# Patient Record
Sex: FEMALE | Race: ASIAN | NOT HISPANIC OR LATINO | URBAN - METROPOLITAN AREA
[De-identification: names, ages, dates, MRNs, and addresses within clinical notes are randomized per-mention and may not be internally consistent; named-entity substitution may affect disease eponyms.]

---

## 2021-03-29 ENCOUNTER — OFFICE VISIT (OUTPATIENT)
Dept: PRIMARY CARE | Facility: CLINIC | Age: 21
End: 2021-03-29
Payer: COMMERCIAL

## 2021-03-29 VITALS
WEIGHT: 135 LBS | RESPIRATION RATE: 16 BRPM | HEIGHT: 63 IN | OXYGEN SATURATION: 98 % | BODY MASS INDEX: 23.92 KG/M2 | DIASTOLIC BLOOD PRESSURE: 70 MMHG | SYSTOLIC BLOOD PRESSURE: 118 MMHG | HEART RATE: 62 BPM

## 2021-03-29 DIAGNOSIS — Z00.00 ENCOUNTER FOR PHYSICAL EXAMINATION: Primary | ICD-10-CM

## 2021-03-29 DIAGNOSIS — F32.1 MODERATE MAJOR DEPRESSION (CMS/HCC): ICD-10-CM

## 2021-03-29 DIAGNOSIS — Z23 NEED FOR TDAP VACCINATION: ICD-10-CM

## 2021-03-29 DIAGNOSIS — N92.6 IRREGULAR MENSES: ICD-10-CM

## 2021-03-29 DIAGNOSIS — L70.0 ACNE VULGARIS: ICD-10-CM

## 2021-03-29 DIAGNOSIS — B36.0 TINEA VERSICOLOR: ICD-10-CM

## 2021-03-29 PROCEDURE — 99203 OFFICE O/P NEW LOW 30 MIN: CPT | Mod: 25 | Performed by: FAMILY MEDICINE

## 2021-03-29 PROCEDURE — 90715 TDAP VACCINE 7 YRS/> IM: CPT | Performed by: FAMILY MEDICINE

## 2021-03-29 PROCEDURE — 99385 PREV VISIT NEW AGE 18-39: CPT | Mod: 25 | Performed by: FAMILY MEDICINE

## 2021-03-29 PROCEDURE — 90471 IMMUNIZATION ADMIN: CPT | Performed by: FAMILY MEDICINE

## 2021-03-29 RX ORDER — CYANOCOBALAMIN (VITAMIN B-12) 2500 MCG
TABLET, SUBLINGUAL SUBLINGUAL
COMMUNITY

## 2021-03-29 RX ORDER — ECONAZOLE NITRATE 10 MG/G
CREAM TOPICAL
COMMUNITY
Start: 2021-02-22 | End: 2021-03-29

## 2021-03-29 RX ORDER — CICLOPIROX 1 G/100ML
SHAMPOO TOPICAL
COMMUNITY
Start: 2021-03-03 | End: 2022-01-07

## 2021-03-29 RX ORDER — FLUCONAZOLE 150 MG/1
TABLET ORAL
Qty: 4 TABLET | Refills: 0 | Status: SHIPPED | OUTPATIENT
Start: 2021-03-29 | End: 2021-04-15

## 2021-03-29 RX ORDER — VALACYCLOVIR HYDROCHLORIDE 500 MG/1
500 TABLET, FILM COATED ORAL
COMMUNITY
Start: 2021-01-25 | End: 2022-02-27 | Stop reason: SDUPTHER

## 2021-03-29 SDOH — HEALTH STABILITY: MENTAL HEALTH: HOW OFTEN DO YOU HAVE A DRINK CONTAINING ALCOHOL?: MONTHLY OR LESS

## 2021-03-29 ASSESSMENT — ENCOUNTER SYMPTOMS
CHILLS: 0
DIARRHEA: 0
VOMITING: 0
ABDOMINAL PAIN: 0
EYE PAIN: 0
SORE THROAT: 0
CHEST TIGHTNESS: 0
APNEA: 0
FREQUENCY: 0
UNEXPECTED WEIGHT CHANGE: 0
JOINT SWELLING: 0
ADENOPATHY: 0
CONSTIPATION: 0
HEADACHES: 0
FATIGUE: 0
DIFFICULTY URINATING: 0
NUMBNESS: 0
MYALGIAS: 0
WOUND: 0
NAUSEA: 0
HEMATURIA: 0
COUGH: 0
FEVER: 0
BRUISES/BLEEDS EASILY: 0
BLOOD IN STOOL: 0
RHINORRHEA: 0
SHORTNESS OF BREATH: 0
DIZZINESS: 0
BACK PAIN: 0
TROUBLE SWALLOWING: 0
WEAKNESS: 0
PHOTOPHOBIA: 0
PALPITATIONS: 0

## 2021-03-29 ASSESSMENT — PATIENT HEALTH QUESTIONNAIRE - PHQ9
SUM OF ALL RESPONSES TO PHQ9 QUESTIONS 1 & 2: 4
SUM OF ALL RESPONSES TO PHQ QUESTIONS 1-9: 16

## 2021-03-29 NOTE — ASSESSMENT & PLAN NOTE
Discussed acne treatment options including topical medications and oral medications.  We discussed spironolactone versus antibiotic and patient is more inclined to spironolactone but will wait for the results of her testosterone and DHEA-S testing.

## 2021-03-29 NOTE — ASSESSMENT & PLAN NOTE
Annual physical exam conducted as above.   Anticipatory guidance provided regarding: healthy diet, exercise, contraception.   Problems addressed as below.   Health Maintenance discussed.   Immunizations updated.

## 2021-03-29 NOTE — PROGRESS NOTES
"NEW PATIENT VISIT - ANNUAL PHYSICAL    SIRIA RICO D.O.  [facility]  [address] [place] [address]  5th Floor  [facility] [place] [address]  [phone]      HISTORY OF PRESENT ILLNESS        CC: establish care, physical     HPI:  Vanessa Guzman is a 20 y.o. female without significant PMHx who presents for a new patient visit.     Today we discussed the followin. Hormonal Issues: \"Feels like hormones have gone wack.\" She was on an OCP and stopped in November because she was worried about taking it. Then in January, she started getting really bad acne. She did have acne when she was [age], she was then on the pill. She feels like her hair has been thicker on her body, she has noticed that she has a lot of inflammation everywhere. She feels like she has been having issues with breakouts. She has also not had a period for 2-3 month. She used to have a regular period prior to being on the pill in the past.     2. Tinea Versicolor: She has been using ciclopirox shampoo which has been unsuccessful in treating this rash, it seems to be getting worse.    Dentist: goes twice yearly  Eye Doctor: goes yearly    PHQ2: 4    Immunizations  HPV: completed  Tdap: last on record    Flu Vaccine advised:     Health Maintenance  Pap Smear: 2019, [facility]  HIV Screening: negative per patient  Hep C Screening: negative per patient     PAST MEDICAL AND SURGICAL HISTORY        Past Medical History:   Diagnosis Date   • Allergies        History reviewed. No pertinent surgical history.  MEDICATIONS          Current Outpatient Medications:   •  biotin 5,000 mcg tablet, sublingual, Place under the tongue., Disp: , Rfl:   •  ciclopirox (LOPROX) 1 % shampoo, USE AS WASH 2 3X PER WEEK, Disp: , Rfl:   •  L.acid,gas,plan,rhm/B.ani/cran (UP4 PROBIOTICS WOMEN'S ORAL), Take by mouth., Disp: , Rfl:   •  valACYclovir (VALTREX) 500 mg tablet, Take 500 mg by mouth once daily., Disp: , Rfl:   •  ZINC ORAL, Take 50 mg by mouth " "every other day., Disp: , Rfl:   •  fluconazole (DIFLUCAN) 150 mg tablet, 300mg once weekly for 2 weeks., Disp: 4 tablet, Rfl: 0  ALLERGIES        Patient has no known allergies.  FAMILY HISTORY        Family History   Problem Relation Age of Onset   • Genetic Disorder Biological Brother    • Hyperlipidemia Maternal Grandfather    • Hypertension Maternal Grandfather    • Breast cancer Neg Hx    • Colon cancer Neg Hx      SOCIAL/ TOBACCO HISTORY        Social History     Tobacco Use   • Smoking status: Never Smoker   • Smokeless tobacco: Never Used   Substance Use Topics   • Alcohol use: Yes     Frequency: Monthly or less   • Drug use: Never     REVIEW OF SYSTEMS        Review of Systems   Constitutional: Negative for chills, fatigue, fever and unexpected weight change.   HENT: Negative for dental problem, ear pain, hearing loss, rhinorrhea, sore throat and trouble swallowing.    Eyes: Negative for photophobia, pain and visual disturbance.   Respiratory: Negative for apnea, cough, chest tightness and shortness of breath.    Cardiovascular: Negative for chest pain, palpitations and leg swelling.   Gastrointestinal: Negative for abdominal pain, blood in stool, constipation, diarrhea, nausea and vomiting.   Endocrine: Negative for cold intolerance and heat intolerance.   Genitourinary: Positive for menstrual problem. Negative for difficulty urinating, frequency and hematuria.   Musculoskeletal: Negative for back pain, joint swelling and myalgias.   Skin: Positive for rash. Negative for wound.   Allergic/Immunologic: Negative for environmental allergies and food allergies.   Neurological: Negative for dizziness, syncope, weakness, numbness and headaches.   Hematological: Negative for adenopathy. Does not bruise/bleed easily.   Psychiatric/Behavioral: Negative for self-injury and suicidal ideas.      PHYSICAL EXAMINATION      Visit Vitals  /70   Pulse 62   Resp 16   Ht 1.588 m (5' 2.5\")   Wt 61.2 kg (135 lb)   SpO2 " 98%   BMI 24.30 kg/m²        Physical Exam  Vitals signs and nursing note reviewed.   Constitutional:       General: She is not in acute distress.     Appearance: She is well-developed.   HENT:      Head: Normocephalic and atraumatic.      Right Ear: Tympanic membrane and ear canal normal.      Left Ear: Tympanic membrane and ear canal normal.      Nose: Nose normal.      Mouth/Throat:      Pharynx: No oropharyngeal exudate.   Eyes:      Conjunctiva/sclera: Conjunctivae normal.      Pupils: Pupils are equal, round, and reactive to light.   Neck:      Musculoskeletal: Normal range of motion and neck supple.      Thyroid: No thyromegaly.   Cardiovascular:      Rate and Rhythm: Normal rate and regular rhythm.      Heart sounds: Normal heart sounds. No murmur. No friction rub. No gallop.    Pulmonary:      Effort: Pulmonary effort is normal.      Breath sounds: Normal breath sounds. No wheezing or rales.   Abdominal:      General: Bowel sounds are normal. There is no distension.      Palpations: Abdomen is soft. There is no mass.      Tenderness: There is no abdominal tenderness.   Musculoskeletal: Normal range of motion.         General: No deformity.   Skin:     General: Skin is warm and dry.      Capillary Refill: Capillary refill takes less than 2 seconds.      Findings: Rash (Hypopigmented lesions across abdomen, acne vulgaris of forehead and chin) present.   Neurological:      Mental Status: She is alert and oriented to person, place, and time.   Psychiatric:         Behavior: Behavior normal.       PRIOR LABS        No lab results available for review at this time.    ASSESSMENT AND PLAN   Assessment   Problem List Items Addressed This Visit        Genitourinary    Irregular menses     Patient with irregular menses, hirsutism, and increased acne off of OCP.  I suspect that she has PCOS and we will plan to check DHEA-S, testosterone, and hemoglobin A1c.  We can follow-up after the results of these tests have  returned for treatment main options for PCOS.  She has not been sexually active in over a year and therefore pregnancy is unlikely.         Relevant Orders    CBC and Differential    Comprehensive metabolic panel    DHEA-sulfate    Testosterone, total and free    Lipid panel    Hemoglobin A1c    FSH/LH    TSH w reflex FT4       Other    Encounter for physical examination - Primary     Annual physical exam conducted as above.   Anticipatory guidance provided regarding: healthy diet, exercise, contraception.   Problems addressed as below.   Health Maintenance discussed.   Immunizations updated.          Relevant Orders    CBC and Differential    Comprehensive metabolic panel    DHEA-sulfate    Testosterone, total and free    Lipid panel    Hemoglobin A1c    FSH/LH    TSH w reflex FT4    Acne vulgaris     Discussed acne treatment options including topical medications and oral medications.  We discussed spironolactone versus antibiotic and patient is more inclined to spironolactone but will wait for the results of her testosterone and DHEA-S testing.         Relevant Medications    ciclopirox (LOPROX) 1 % shampoo    Other Relevant Orders    CBC and Differential    Comprehensive metabolic panel    DHEA-sulfate    Testosterone, total and free    Lipid panel    Hemoglobin A1c    FSH/LH    TSH w reflex FT4    Ambulatory referral to Dermatology    Tinea versicolor     Rash across abdomen is consistent with tinea versicolor, she has failed treatment with topical medications and we will treat with Diflucan 300 mg once weekly x2 weeks.         Relevant Medications    ciclopirox (LOPROX) 1 % shampoo    Other Relevant Orders    Ambulatory referral to Dermatology    Moderate major depression (CMS/HCC)     PHQ-9 score of 16, no suicidal or homicidal ideation.  Patient is currently not interested in seeing a therapist or starting any medications but we discussed that these would be good options in the future should she choose to do  so.           Other Visit Diagnoses     Need for Tdap vaccination        Relevant Orders    Tdap vaccine greater than or equal to 6yo IM (Completed)             I spent 42 minutes on this date of service performing the following activities: obtaining history, performing examination, entering orders, documenting, preparing for visit and providing counseling and education.  Jenna Madison,   3/29/2021

## 2021-03-29 NOTE — PATIENT INSTRUCTIONS
"Thank you for allowing me to participate in your care, it was a pleasure to care for you today. Should any questions or concerns arise, please call or message me on My Chart.     Your health is our main goal, in order to stay healthy consider the following:    - Exercise at least 150 minutes per week. (30 minutes per day, 5 times per week)  - Eat a healthy, well-rounded diet, low in processed foods and high in fruits, vegetables, and lean protein.  - Limit alcoholic beverages to no more than 1 drink per day for women and no more than 2 drinks per day for men.     Patient Education   https://www.cdc.gov/vaccines/hcp/vis/vis-statements/tdap.pdf\">   Tdap (Tetanus, Diphtheria, Pertussis) Vaccine: What You Need to Know  1. Why get vaccinated?  Tdap vaccine can prevent tetanus, diphtheria, and pertussis.  Diphtheria and pertussis spread from person to person. Tetanus enters the body through cuts or wounds.  · TETANUS (T) causes painful stiffening of the muscles. Tetanus can lead to serious health problems, including being unable to open the mouth, having trouble swallowing and breathing, or death.  · DIPHTHERIA (D) can lead to difficulty breathing, heart failure, paralysis, or death.  · PERTUSSIS (aP), also known as \"whooping cough,\" can cause uncontrollable, violent coughing which makes it hard to breathe, eat, or drink. Pertussis can be extremely serious in babies and young children, causing pneumonia, convulsions, brain damage, or death. In teens and adults, it can cause weight loss, loss of bladder control, passing out, and rib fractures from severe coughing.  2. Tdap vaccine  Tdap is only for children 7 years and older, adolescents, and adults.   Adolescents should receive a single dose of Tdap, preferably at age 11 or 12 years.  Pregnant women should get a dose of Tdap during every pregnancy, to protect the  from pertussis. Infants are most at risk for severe, life-threatening complications from " pertussis.  Adults who have never received Tdap should get a dose of Tdap.  Also, adults should receive a booster dose every 10 years, or earlier in the case of a severe and dirty wound or burn. Booster doses can be either Tdap or Td (a different vaccine that protects against tetanus and diphtheria but not pertussis).  Tdap may be given at the same time as other vaccines.  3. Talk with your health care provider  Tell your vaccine provider if the person getting the vaccine:  · Has had an allergic reaction after a previous dose of any vaccine that protects against tetanus, diphtheria, or pertussis, or has any severe, life-threatening allergies.  · Has had a coma, decreased level of consciousness, or prolonged seizures within 7 days after a previous dose of any pertussis vaccine (DTP, DTaP, or Tdap).  · Has seizures or another nervous system problem.  · Has ever had Guillain-Barré Syndrome (also called GBS).  · Has had severe pain or swelling after a previous dose of any vaccine that protects against tetanus or diphtheria.  In some cases, your health care provider may decide to postpone Tdap vaccination to a future visit.   People with minor illnesses, such as a cold, may be vaccinated. People who are moderately or severely ill should usually wait until they recover before getting Tdap vaccine.   Your health care provider can give you more information.  4. Risks of a vaccine reaction  · Pain, redness, or swelling where the shot was given, mild fever, headache, feeling tired, and nausea, vomiting, diarrhea, or stomachache sometimes happen after Tdap vaccine.  People sometimes faint after medical procedures, including vaccination. Tell your provider if you feel dizzy or have vision changes or ringing in the ears.   As with any medicine, there is a very remote chance of a vaccine causing a severe allergic reaction, other serious injury, or death.  5. What if there is a serious problem?  An allergic reaction could occur  after the vaccinated person leaves the clinic. If you see signs of a severe allergic reaction (hives, swelling of the face and throat, difficulty breathing, a fast heartbeat, dizziness, or weakness), call 9-1-1 and get the person to the nearest hospital.  For other signs that concern you, call your health care provider.   Adverse reactions should be reported to the Vaccine Adverse Event Reporting System (VAERS). Your health care provider will usually file this report, or you can do it yourself. Visit the VAERS website at www.vaers.hhs.gov or call 1-180.699.8318. VAERS is only for reporting reactions, and VAERS staff do not give medical advice.  6. The National Vaccine Injury Compensation Program  The National Vaccine Injury Compensation Program (VICP) is a federal program that was created to compensate people who may have been injured by certain vaccines. Visit the VICP website at www.hrsa.gov/vaccinecompensation or call 1-310.947.4642 to learn about the program and about filing a claim. There is a time limit to file a claim for compensation.  7. How can I learn more?  · Ask your health care provider.  · Call your local or state health department.  · Contact the Centers for Disease Control and Prevention (CDC):  ? Call 1-922.516.5723 (4-472-COO-INFO) or  ? Visit CDC's website at www.cdc.gov/vaccines  Vaccine Information Statement Tdap (Tetanus, Diphtheria, Pertussis) Vaccine (04/01/2020)  This information is not intended to replace advice given to you by your health care provider. Make sure you discuss any questions you have with your health care provider.  Document Released: 06/18/2013 Document Revised: 04/10/2020 Document Reviewed: 04/13/2020  Elsevier Patient Education © 2020 Elsevier Inc.

## 2021-03-29 NOTE — ASSESSMENT & PLAN NOTE
PHQ-9 score of 16, no suicidal or homicidal ideation.  Patient is currently not interested in seeing a therapist or starting any medications but we discussed that these would be good options in the future should she choose to do so.

## 2021-03-29 NOTE — ASSESSMENT & PLAN NOTE
Rash across abdomen is consistent with tinea versicolor, she has failed treatment with topical medications and we will treat with Diflucan 300 mg once weekly x2 weeks.

## 2021-03-29 NOTE — ASSESSMENT & PLAN NOTE
Patient with irregular menses, hirsutism, and increased acne off of OCP.  I suspect that she has PCOS and we will plan to check DHEA-S, testosterone, and hemoglobin A1c.  We can follow-up after the results of these tests have returned for treatment main options for PCOS.  She has not been sexually active in over a year and therefore pregnancy is unlikely.

## 2021-04-06 LAB
ALBUMIN SERPL-MCNC: 4.5 G/DL (ref 3.9–5)
ALBUMIN/GLOB SERPL: 1.6 {RATIO} (ref 1.2–2.2)
ALP SERPL-CCNC: 66 IU/L (ref 39–117)
ALT SERPL-CCNC: 13 IU/L (ref 0–32)
AST SERPL-CCNC: 15 IU/L (ref 0–40)
BASOPHILS # BLD AUTO: 0.1 X10E3/UL (ref 0–0.2)
BASOPHILS NFR BLD AUTO: 1 %
BILIRUB SERPL-MCNC: 0.3 MG/DL (ref 0–1.2)
BUN SERPL-MCNC: 9 MG/DL (ref 6–20)
BUN/CREAT SERPL: 14 (ref 9–23)
CALCIUM SERPL-MCNC: 9.8 MG/DL (ref 8.7–10.2)
CHLORIDE SERPL-SCNC: 104 MMOL/L (ref 96–106)
CHOLEST SERPL-MCNC: 171 MG/DL (ref 100–199)
CO2 SERPL-SCNC: 24 MMOL/L (ref 20–29)
CREAT SERPL-MCNC: 0.65 MG/DL (ref 0.57–1)
DHEA-S SERPL-MCNC: 352 UG/DL (ref 110–431.7)
EOSINOPHIL # BLD AUTO: 0.3 X10E3/UL (ref 0–0.4)
EOSINOPHIL NFR BLD AUTO: 4 %
ERYTHROCYTE [DISTWIDTH] IN BLOOD BY AUTOMATED COUNT: 12.7 % (ref 11.7–15.4)
FSH SERPL-ACNC: 4.1 MIU/ML
GLOBULIN SER CALC-MCNC: 2.9 G/DL (ref 1.5–4.5)
GLUCOSE SERPL-MCNC: 96 MG/DL (ref 65–99)
HBA1C MFR BLD: 5.6 % (ref 4.8–5.6)
HCT VFR BLD AUTO: 41.8 % (ref 34–46.6)
HDLC SERPL-MCNC: 77 MG/DL
HGB BLD-MCNC: 13.2 G/DL (ref 11.1–15.9)
IMM GRANULOCYTES # BLD AUTO: 0 X10E3/UL (ref 0–0.1)
IMM GRANULOCYTES NFR BLD AUTO: 0 %
LAB CORP EGFR IF AFRICN AM: 146 ML/MIN/1.73
LAB CORP EGFR IF NONAFRICN AM: 126 ML/MIN/1.73
LDLC SERPL CALC-MCNC: 75 MG/DL (ref 0–99)
LH SERPL-ACNC: 10.1 MIU/ML
LYMPHOCYTES # BLD AUTO: 2.2 X10E3/UL (ref 0.7–3.1)
LYMPHOCYTES NFR BLD AUTO: 32 %
MCH RBC QN AUTO: 26.6 PG (ref 26.6–33)
MCHC RBC AUTO-ENTMCNC: 31.6 G/DL (ref 31.5–35.7)
MCV RBC AUTO: 84 FL (ref 79–97)
MONOCYTES # BLD AUTO: 0.3 X10E3/UL (ref 0.1–0.9)
MONOCYTES NFR BLD AUTO: 5 %
MORPHOLOGY BLD-IMP: NORMAL
NEUTROPHILS # BLD AUTO: 4.1 X10E3/UL (ref 1.4–7)
NEUTROPHILS NFR BLD AUTO: 58 %
PLATELET # BLD AUTO: 318 X10E3/UL (ref 150–450)
POTASSIUM SERPL-SCNC: 4.5 MMOL/L (ref 3.5–5.2)
PROT SERPL-MCNC: 7.4 G/DL (ref 6–8.5)
RBC # BLD AUTO: 4.96 X10E6/UL (ref 3.77–5.28)
SODIUM SERPL-SCNC: 142 MMOL/L (ref 134–144)
SPECIMEN STATUS: NORMAL
TESTOST FREE SERPL-MCNC: 8.8 PG/ML (ref 0–4.2)
TESTOST SERPL-MCNC: 118 NG/DL (ref 8–48)
TRIGL SERPL-MCNC: 109 MG/DL (ref 0–149)
TSH SERPL DL<=0.005 MIU/L-ACNC: 1.37 UIU/ML (ref 0.45–4.5)
VLDLC SERPL CALC-MCNC: 19 MG/DL (ref 5–40)
WBC # BLD AUTO: 6.9 X10E3/UL (ref 3.4–10.8)

## 2021-04-15 ENCOUNTER — OFFICE VISIT (OUTPATIENT)
Dept: PRIMARY CARE | Facility: CLINIC | Age: 21
End: 2021-04-15
Payer: COMMERCIAL

## 2021-04-15 VITALS
DIASTOLIC BLOOD PRESSURE: 70 MMHG | OXYGEN SATURATION: 98 % | BODY MASS INDEX: 24.84 KG/M2 | WEIGHT: 138 LBS | SYSTOLIC BLOOD PRESSURE: 110 MMHG | HEART RATE: 63 BPM | RESPIRATION RATE: 16 BRPM

## 2021-04-15 DIAGNOSIS — E28.2 PCOS (POLYCYSTIC OVARIAN SYNDROME): Primary | ICD-10-CM

## 2021-04-15 DIAGNOSIS — M79.632 LEFT FOREARM PAIN: ICD-10-CM

## 2021-04-15 DIAGNOSIS — L70.0 ACNE VULGARIS: ICD-10-CM

## 2021-04-15 DIAGNOSIS — F50.819 BINGE EATING DISORDER: ICD-10-CM

## 2021-04-15 PROBLEM — Z00.00 ENCOUNTER FOR PHYSICAL EXAMINATION: Status: RESOLVED | Noted: 2021-03-29 | Resolved: 2021-04-15

## 2021-04-15 PROCEDURE — 99214 OFFICE O/P EST MOD 30 MIN: CPT | Performed by: FAMILY MEDICINE

## 2021-04-15 PROCEDURE — 3008F BODY MASS INDEX DOCD: CPT | Performed by: FAMILY MEDICINE

## 2021-04-15 RX ORDER — SPIRONOLACTONE 25 MG/1
25 TABLET ORAL DAILY
Qty: 30 TABLET | Refills: 0 | Status: SHIPPED | OUTPATIENT
Start: 2021-04-15 | End: 2021-05-03 | Stop reason: SDUPTHER

## 2021-04-15 ASSESSMENT — ENCOUNTER SYMPTOMS
SHORTNESS OF BREATH: 0
FEVER: 0
PALPITATIONS: 0
DIARRHEA: 0
CONSTIPATION: 0
NAUSEA: 0
CHILLS: 0
CHEST TIGHTNESS: 0
ARTHRALGIAS: 1
VOMITING: 0
CHOKING: 0
MYALGIAS: 0

## 2021-04-15 NOTE — ASSESSMENT & PLAN NOTE
Start spironolactone 25 mg in the morning.  Patient counseled on adverse effects if she were to become pregnant as well as possible lightheadedness or dizziness and increased urination.

## 2021-04-15 NOTE — PATIENT INSTRUCTIONS
Thank you for allowing me to participate in your care, it was a pleasure to care for you today. Should any questions or concerns arise, please call or message me on My Chart.     Here is the information for the psychologist I recommend:    Adore Garcia, PhD  Licensed Clinical Psychologist  [facility]  [facility] [facility], [address]  [place] [address]  [phone]  [email or web address]  Offices also in [facility], [facility], [place], [place] & [place]    ________________________________________________    Medications for Binge Eating:  - Topamax   - Vyvanse

## 2021-04-15 NOTE — PROGRESS NOTES
ESTABLISHED PATIENT OFFICE VISIT    SIRIA RICO D.O.  [facility]  [address] [place] [address]  5th Floor  [place] [address]  [phone]      HISTORY OF PRESENT ILLNESS        HPI:  Vanessa Guzman is a 20 y.o. female with a history of irregular menses, depression, and acne who presents for   Chief Complaint   Patient presents with   • Follow-up     PCOS:  Lab Results   Component Value Date    TESTOST 118 (H) 04/05/2021    FREETEST 8.8 (H) 04/05/2021   She was on an OCP and stopped in November because she was worried about taking it. Then in January, she started getting really bad acne. She did have acne when she was [age], she was then on the pill. She feels like her hair has been thicker on her body, she has noticed that she has a lot of inflammation everywhere. She feels like she has been having issues with breakouts. She has also not had a period for 2-3 month. She used to have a regular period prior to being on the pill in the past.     Binge Eating Disorder:  - She has seen a therapist in the past. It has improved in the past year.   - She will binge about 2-3 days per week.     Arm Pain:  - She got blood work and then started getting wrist pain afterwards.   - No weakness or numbness    PAST MEDICAL AND SURGICAL HISTORY        Past Medical History:   Diagnosis Date   • Allergies        No past surgical history on file.  MEDICATIONS          Current Outpatient Medications:   •  biotin 5,000 mcg tablet, sublingual, Place under the tongue., Disp: , Rfl:   •  ciclopirox (LOPROX) 1 % shampoo, USE AS WASH 2 3X PER WEEK, Disp: , Rfl:   •  L.acid,gas,plan,rhm/B.ani/cran (UP4 PROBIOTICS WOMEN'S ORAL), Take by mouth., Disp: , Rfl:   •  valACYclovir (VALTREX) 500 mg tablet, Take 500 mg by mouth once daily., Disp: , Rfl:   •  ZINC ORAL, Take 50 mg by mouth every other day., Disp: , Rfl:   •  fluconazole (DIFLUCAN) 150 mg tablet, 300mg once weekly for 2 weeks., Disp: 4 tablet, Rfl: 0  •  spironolactone  (ALDACTONE) 25 mg tablet, Take 1 tablet (25 mg total) by mouth daily., Disp: 30 tablet, Rfl: 0  ALLERGIES        Patient has no known allergies.  FAMILY HISTORY        Family History   Problem Relation Age of Onset   • Genetic Disorder Biological Brother    • Hyperlipidemia Maternal Grandfather    • Hypertension Maternal Grandfather    • Breast cancer Neg Hx    • Colon cancer Neg Hx      SOCIAL/ TOBACCO HISTORY        Social History     Tobacco Use   • Smoking status: Never Smoker   • Smokeless tobacco: Never Used   Substance Use Topics   • Alcohol use: Yes   • Drug use: Never     REVIEW OF SYSTEMS        Review of Systems   Constitutional: Negative for chills and fever.   Respiratory: Negative for choking, chest tightness and shortness of breath.    Cardiovascular: Negative for chest pain and palpitations.   Gastrointestinal: Negative for constipation, diarrhea, nausea and vomiting.   Genitourinary: Positive for menstrual problem. Negative for pelvic pain and vaginal bleeding.   Musculoskeletal: Positive for arthralgias. Negative for myalgias.   Skin: Positive for rash.      PHYSICAL EXAMINATION     Visit Vitals  /70   Pulse 63   Resp 16   Wt 62.6 kg (138 lb)   SpO2 98%   BMI 24.84 kg/m²        Physical Exam  Constitutional:       General: She is not in acute distress.     Appearance: She is not diaphoretic.   HENT:      Head: Normocephalic and atraumatic.      Nose: Nose normal.      Mouth/Throat:      Mouth: Mucous membranes are not dry.   Eyes:      Conjunctiva/sclera: Conjunctivae normal.      Pupils: Pupils are equal, round, and reactive to light.   Pulmonary:      Effort: Pulmonary effort is normal.   Musculoskeletal:      Cervical back: Neck supple.      Comments: Full range of motion of left wrist and elbow, sensation intact bilateral upper extremities.  No tenderness to palpation.   Neurological:      Mental Status: She is alert.      Cranial Nerves: No cranial nerve deficit.        ASSESSMENT AND  PLAN   Assessment   Problem List Items Addressed This Visit        Nervous    Left forearm pain     Left forearm pain most likely related to nerve irritation with blood draw.  Counseled patient this will likely continue to improve but should it not would consider further work-up with imaging.            Genitourinary    PCOS (polycystic ovarian syndrome) - Primary     Elevated testosterone levels and irregular menses are consistent with PCOS.  I suspect that her birth control pill was controlling the symptoms and when she discontinued it she started to notice acne and irregular menses.  She is not interested in restarting the birth control pill at this time though we did discuss that it can help with her PCOS.  She was provided with educational materials about PCOS today and we discussed the mechanism in depth.  We also discussed supplements including D-inositol.             Other    Acne vulgaris     Start spironolactone 25 mg in the morning.  Patient counseled on adverse effects if she were to become pregnant as well as possible lightheadedness or dizziness and increased urination.         Binge eating disorder     Patient reports binge eating 2-3 times per week and hiding this from others and feeling guilty about it afterwards.  She had sought therapy while she was at school but due to the pandemic this ended.  She is interested in seeking therapy again but not while living with her parents.  I gave her the information for [facility] today.  We also discussed that there are medication options to help with binge eating and she will do some research into these.                I spent 38 minutes on this date of service performing the following activities: obtaining history, performing examination, entering orders, documenting, preparing for visit, providing counseling and education and communicating results.    Jenna Madison, DO  4/15/2021

## 2021-04-15 NOTE — ASSESSMENT & PLAN NOTE
Elevated testosterone levels and irregular menses are consistent with PCOS.  I suspect that her birth control pill was controlling the symptoms and when she discontinued it she started to notice acne and irregular menses.  She is not interested in restarting the birth control pill at this time though we did discuss that it can help with her PCOS.  She was provided with educational materials about PCOS today and we discussed the mechanism in depth.  We also discussed supplements including D-inositol.

## 2021-04-15 NOTE — ASSESSMENT & PLAN NOTE
Patient reports binge eating 2-3 times per week and hiding this from others and feeling guilty about it afterwards.  She had sought therapy while she was at school but due to the pandemic this ended.  She is interested in seeking therapy again but not while living with her parents.  I gave her the information for [facility] today.  We also discussed that there are medication options to help with binge eating and she will do some research into these.

## 2021-04-15 NOTE — ASSESSMENT & PLAN NOTE
Left forearm pain most likely related to nerve irritation with blood draw.  Counseled patient this will likely continue to improve but should it not would consider further work-up with imaging.

## 2021-07-06 RX ORDER — SPIRONOLACTONE 50 MG/1
TABLET, FILM COATED ORAL
Qty: 30 TABLET | Refills: 1 | Status: SHIPPED | OUTPATIENT
Start: 2021-07-06 | End: 2022-01-07

## 2022-01-07 ENCOUNTER — OFFICE VISIT (OUTPATIENT)
Dept: PRIMARY CARE | Facility: CLINIC | Age: 22
End: 2022-01-07
Payer: COMMERCIAL

## 2022-01-07 VITALS
BODY MASS INDEX: 20.61 KG/M2 | OXYGEN SATURATION: 99 % | TEMPERATURE: 97.1 F | DIASTOLIC BLOOD PRESSURE: 60 MMHG | HEART RATE: 64 BPM | WEIGHT: 112 LBS | HEIGHT: 62 IN | SYSTOLIC BLOOD PRESSURE: 100 MMHG

## 2022-01-07 DIAGNOSIS — R10.13 DYSPEPSIA: ICD-10-CM

## 2022-01-07 DIAGNOSIS — N92.6 IRREGULAR MENSES: ICD-10-CM

## 2022-01-07 DIAGNOSIS — E28.2 PCOS (POLYCYSTIC OVARIAN SYNDROME): ICD-10-CM

## 2022-01-07 DIAGNOSIS — R19.7 DIARRHEA, UNSPECIFIED TYPE: Primary | ICD-10-CM

## 2022-01-07 PROCEDURE — 3008F BODY MASS INDEX DOCD: CPT | Performed by: FAMILY MEDICINE

## 2022-01-07 PROCEDURE — 99214 OFFICE O/P EST MOD 30 MIN: CPT | Performed by: FAMILY MEDICINE

## 2022-01-07 RX ORDER — SPIRONOLACTONE 100 MG/1
200 TABLET, FILM COATED ORAL
COMMUNITY
Start: 2021-12-04 | End: 2022-07-01 | Stop reason: SDUPTHER

## 2022-01-07 RX ORDER — TRETINOIN 1 MG/G
CREAM TOPICAL
COMMUNITY
Start: 2021-12-22

## 2022-01-07 RX ORDER — DICYCLOMINE HYDROCHLORIDE 10 MG/1
CAPSULE ORAL
Qty: 90 CAPSULE | Refills: 0 | Status: SHIPPED | OUTPATIENT
Start: 2022-01-07 | End: 2022-02-08

## 2022-01-07 ASSESSMENT — ENCOUNTER SYMPTOMS
VOMITING: 1
CHEST TIGHTNESS: 0
CHOKING: 0
ANAL BLEEDING: 0
NAUSEA: 1
DIARRHEA: 1
ABDOMINAL PAIN: 1
PALPITATIONS: 0
CHILLS: 0
SHORTNESS OF BREATH: 0
FEVER: 0
BLOOD IN STOOL: 1
CONSTIPATION: 0
ABDOMINAL DISTENTION: 1

## 2022-01-07 NOTE — ASSESSMENT & PLAN NOTE
She has found that insitol has been helpful.   She saw gynecology for misses menses, but she has not been having menses for the past few months.   We discussed the importance of taking OCP for endometrial protection.   She will follow-up with gynecology for this.

## 2022-01-07 NOTE — ASSESSMENT & PLAN NOTE
Patient having diarrhea daily which occasionally has blood.   Check stool studies to evaluate for infectious and inflammatory causes of diarrhea.   Differential includes IBS vs. IBD.   Referred to GI for colonoscopy and endoscopy.   If labs unremarkable, check CT abdomen/pelvis.   Check TSH, CMP, and CBC.

## 2022-01-07 NOTE — PROGRESS NOTES
ESTABLISHED PATIENT OFFICE VISIT    SIRIA RICO D.O.  [facility]  [address] [place] [address]  [address]  [place] [address] [phone]      HISTORY OF PRESENT ILLNESS        HPI:  Vanessa Guzman is a 21 y.o. female with a history of PCOS, BED, and depression who presents for evaluation of GI issues.     GI issues:  Symptoms started a few months ago. She had been researching ways to manage PCOS and cut out dairy. She then had ice cream which triggered a diarrhea episode. She feels like she is having episodes after eating when she is having diarrhea and bloating after eating that has gotten worse and worse. She notices that now she is even having diarrhea and bloating unrelated to eating. She just had a lot of pain recently with a bowel movement. She started taking inositol around the time everything happened.     PAST MEDICAL AND SURGICAL HISTORY        Past Medical History:   Diagnosis Date   • Allergies        History reviewed. No pertinent surgical history.  MEDICATIONS          Current Outpatient Medications:   •  biotin 5,000 mcg tablet, sublingual, Place under the tongue., Disp: , Rfl:   •  L.acid,gas,plan,rhm/B.ani/cran (UP4 PROBIOTICS WOMEN'S ORAL), Take by mouth., Disp: , Rfl:   •  spironolactone 100 mg tablet, 200 mg. , Disp: , Rfl:   •  tretinoin 0.1 % cream, , Disp: , Rfl:   •  valACYclovir (VALTREX) 500 mg tablet, Take 500 mg by mouth once daily., Disp: , Rfl:   •  dicyclomine 10 mg capsule, 4 times per day as needed., Disp: 90 capsule, Rfl: 0  ALLERGIES        Patient has no known allergies.  FAMILY HISTORY        Family History   Problem Relation Age of Onset   • Genetic Disorder Biological Brother    • Hyperlipidemia Maternal Grandfather    • Hypertension Maternal Grandfather    • Breast cancer Neg Hx    • Colon cancer Neg Hx      SOCIAL/ TOBACCO HISTORY        Social History     Tobacco Use   • Smoking status: Never Smoker   • Smokeless tobacco: Never Used   Substance Use Topics  "  • Alcohol use: Yes   • Drug use: Never     REVIEW OF SYSTEMS        Review of Systems   Constitutional: Negative for chills and fever.   Respiratory: Negative for choking, chest tightness and shortness of breath.    Cardiovascular: Negative for chest pain and palpitations.   Gastrointestinal: Positive for abdominal distention, abdominal pain, blood in stool, diarrhea, nausea and vomiting. Negative for anal bleeding and constipation.      PHYSICAL EXAMINATION     Visit Vitals  /60 (BP Location: Left upper arm, Patient Position: Sitting)   Pulse 64   Temp 36.2 °C (97.1 °F) (Temporal)   Ht 1.575 m (5' 2\")   Wt 50.8 kg (112 lb)   SpO2 99%   BMI 20.49 kg/m²        Physical Exam  Constitutional:       General: She is not in acute distress.     Appearance: She is not diaphoretic.   HENT:      Head: Normocephalic and atraumatic.      Nose: Nose normal.      Mouth/Throat:      Mouth: Mucous membranes are not dry.   Eyes:      Conjunctiva/sclera: Conjunctivae normal.      Pupils: Pupils are equal, round, and reactive to light.   Cardiovascular:      Rate and Rhythm: Normal rate and regular rhythm.   Pulmonary:      Effort: Pulmonary effort is normal.      Breath sounds: No wheezing, rhonchi or rales.   Abdominal:      General: Abdomen is flat. Bowel sounds are normal. There is no distension.      Palpations: Abdomen is soft. There is no mass.      Tenderness: There is no abdominal tenderness. There is no guarding or rebound.      Hernia: No hernia is present.   Musculoskeletal:      Cervical back: Neck supple.   Neurological:      Mental Status: She is alert.      Cranial Nerves: No cranial nerve deficit.        ASSESSMENT AND PLAN   Assessment   Problem List Items Addressed This Visit        Digestive    Diarrhea - Primary     Patient having diarrhea daily which occasionally has blood.   Check stool studies to evaluate for infectious and inflammatory causes of diarrhea.   Differential includes IBS vs. IBD.   Referred " to GI for colonoscopy and endoscopy.   If labs unremarkable, check CT abdomen/pelvis.   Check TSH, CMP, and CBC.          Relevant Orders    Ambulatory referral to Gastroenterology    Stool culture    Lactoferrin, Quant, Stool    Calprotectin, Fecal    Chromogranin A    Celiac reflex panel    Comprehensive metabolic panel    CBC and Differential    TSH w reflex FT4    C-reactive protein    Pancreatic Elastase, Fecal    Fecal leukocytes    Ova and parasite screen    Dyspepsia     Patient concerned for potential food intolerances.   Check H. Pylori.          Relevant Orders    Helicobacter pylori Antigen, EIA, Stool       Genitourinary    Irregular menses     See plan under PCOS.          PCOS (polycystic ovarian syndrome)     She has found that insitol has been helpful.   She saw gynecology for misses menses, but she has not been having menses for the past few months.   We discussed the importance of taking OCP for endometrial protection.   She will follow-up with gynecology for this.                 I spent 35 minutes on this date of service performing the following activities: obtaining history, performing examination, entering orders, documenting, preparing for visit and providing counseling and education.    Jenna Madison, DO  1/7/2022

## 2022-01-10 LAB
ALBUMIN SERPL-MCNC: 4.5 G/DL (ref 3.9–5)
ALBUMIN/GLOB SERPL: 1.5 {RATIO} (ref 1.2–2.2)
ALP SERPL-CCNC: 47 IU/L (ref 44–121)
ALT SERPL-CCNC: 10 IU/L (ref 0–32)
AST SERPL-CCNC: 15 IU/L (ref 0–40)
BASOPHILS # BLD AUTO: 0.1 X10E3/UL (ref 0–0.2)
BASOPHILS NFR BLD AUTO: 1 %
BILIRUB SERPL-MCNC: 0.2 MG/DL (ref 0–1.2)
BUN SERPL-MCNC: 8 MG/DL (ref 6–20)
BUN/CREAT SERPL: 12 (ref 9–23)
CALCIUM SERPL-MCNC: 9.8 MG/DL (ref 8.7–10.2)
CHLORIDE SERPL-SCNC: 102 MMOL/L (ref 96–106)
CO2 SERPL-SCNC: 23 MMOL/L (ref 20–29)
CREAT SERPL-MCNC: 0.69 MG/DL (ref 0.57–1)
EOSINOPHIL # BLD AUTO: 0.2 X10E3/UL (ref 0–0.4)
EOSINOPHIL NFR BLD AUTO: 3 %
ERYTHROCYTE [DISTWIDTH] IN BLOOD BY AUTOMATED COUNT: 12.3 % (ref 11.7–15.4)
GLOBULIN SER CALC-MCNC: 3 G/DL (ref 1.5–4.5)
GLUCOSE SERPL-MCNC: 96 MG/DL (ref 65–99)
HCT VFR BLD AUTO: 40.1 % (ref 34–46.6)
HGB BLD-MCNC: 13 G/DL (ref 11.1–15.9)
IMM GRANULOCYTES # BLD AUTO: 0 X10E3/UL (ref 0–0.1)
IMM GRANULOCYTES NFR BLD AUTO: 0 %
LAB CORP EGFR IF AFRICN AM: 142 ML/MIN/1.73
LAB CORP EGFR IF NONAFRICN AM: 123 ML/MIN/1.73
LYMPHOCYTES # BLD AUTO: 2.1 X10E3/UL (ref 0.7–3.1)
LYMPHOCYTES NFR BLD AUTO: 36 %
MCH RBC QN AUTO: 28.4 PG (ref 26.6–33)
MCHC RBC AUTO-ENTMCNC: 32.4 G/DL (ref 31.5–35.7)
MCV RBC AUTO: 88 FL (ref 79–97)
MONOCYTES # BLD AUTO: 0.4 X10E3/UL (ref 0.1–0.9)
MONOCYTES NFR BLD AUTO: 6 %
NEUTROPHILS # BLD AUTO: 3.1 X10E3/UL (ref 1.4–7)
NEUTROPHILS NFR BLD AUTO: 54 %
PLATELET # BLD AUTO: 328 X10E3/UL (ref 150–450)
POTASSIUM SERPL-SCNC: 3.9 MMOL/L (ref 3.5–5.2)
PROT SERPL-MCNC: 7.5 G/DL (ref 6–8.5)
RBC # BLD AUTO: 4.58 X10E6/UL (ref 3.77–5.28)
SODIUM SERPL-SCNC: 137 MMOL/L (ref 134–144)
T4 FREE SERPL-MCNC: 1.36 NG/DL (ref 0.82–1.77)
TSH SERPL DL<=0.005 MIU/L-ACNC: 0.83 UIU/ML (ref 0.45–4.5)
WBC # BLD AUTO: 5.9 X10E3/UL (ref 3.4–10.8)

## 2022-01-11 LAB
CRP SERPL-MCNC: <1 MG/L (ref 0–10)
IGA SERPL-MCNC: 297 MG/DL (ref 87–352)
TTG IGA SER-ACNC: <2 U/ML (ref 0–3)

## 2022-01-12 LAB
CGA SERPL-MCNC: 29.5 NG/ML (ref 0–101.8)
H PYLORI AG STL QL IA: NEGATIVE

## 2022-01-13 LAB — CALPROTECTIN STL-MCNT: 40 UG/G (ref 0–120)

## 2022-01-14 LAB
ELASTASE PANC STL-MCNT: >500 UG ELAST./G
LACTOFERRIN STL-MCNC: 4.42 UG/ML(G) (ref 0–7.24)
O+P SPEC MICRO: NORMAL
WBC STL QL MICRO: NORMAL

## 2022-01-15 LAB
BACTERIA SPEC CULT: NORMAL
CAMPYLOBACTER STL CULT: NORMAL
E COLI SXT STL QL IA: NEGATIVE
SALM + SHIG STL CULT: NORMAL

## 2022-02-01 ENCOUNTER — TELEPHONE (OUTPATIENT)
Dept: PRIMARY CARE | Facility: CLINIC | Age: 22
End: 2022-02-01
Payer: COMMERCIAL

## 2022-02-01 NOTE — TELEPHONE ENCOUNTER
Patient called in per Dr Reed note to make an appmnt for tele med follow up for this week or next week . There were no availabilities until the 25th. Patient is not available Thursday mornings or Friday 11-12. Patient would like a call back to schedule

## 2022-02-07 ENCOUNTER — TELEPHONE (OUTPATIENT)
Dept: PRIMARY CARE | Facility: CLINIC | Age: 22
End: 2022-02-07
Payer: COMMERCIAL

## 2022-02-07 ASSESSMENT — ENCOUNTER SYMPTOMS
PALPITATIONS: 0
CHILLS: 0
SHORTNESS OF BREATH: 0
VOMITING: 0
CONSTIPATION: 0
FEVER: 0
CHOKING: 0
NAUSEA: 0
CHEST TIGHTNESS: 0
DIARRHEA: 0

## 2022-02-07 NOTE — PROGRESS NOTES
Verification of Patient Location:  The patient affirms they are currently located in the following state: [place]    Request for Consent:    Audio and Video Encounter   Hello, my name is Jenna Rico DO.  Before we proceed, can you please verify your identification by telling me your full name and date of birth?  Can you tell me who is in the room with you?    You and I are about to have a telemedicine check-in or visit because you have requested it.  This is a live video-conference.  I am a real person, speaking to you in real time.  There is no one else with me on the video-conference.  However, when we use ([employer], [employer], etc) it is important for you to know that the video-conference may not be secure or private.  I am not recording this conversation and I am asking you not to record it.  This telemedicine visit will be billed to your health insurance or you, if you are self-insured.  You understand you will be responsible for any copayments or coinsurances that apply to your telemedicine visit.  Communication platform used for this encounter:  [employer] Video Visit (no Zoom)     Before starting our telemedicine visit, I am required to get your consent for this virtual check-in or visit by telemedicine. Do you consent?      Patient Response to Request for Consent:  Yes    ESTABLISHED PATIENT OFFICE VISIT    JENNA RICO D.O.  [facility]  [address] [place] [address]  [address] Floor  [facility] [place] [address]  [phone]      HISTORY OF PRESENT ILLNESS        HPI:  Vanessa Guzman is a 21 y.o. female with a history of dyspepsia, PCOS, and depression who presents for No chief complaint on file.    Insulin Resistance/PCOS:  Testosterone 118, free testosterone 8.8  Tried inositol but had significant GI side effects.   She has been off the Ovasitol and diarrhea has resolved.   She hasn't been taking pre-biotics and probiotics.   She feels like she is not yet ready to start OCP.    Hair Loss:  She  noticed started in December that she has been having a lot of hair loss. She notices that her part is widening and she also has a bald spot in the back of her head. She is concerned that this may not be related to PCOS. No redness, scaling, or itching of bald spot. She will be seeing dermatology later today. She has increased her biotin supplement as well.     PAST MEDICAL AND SURGICAL HISTORY        Past Medical History:   Diagnosis Date   • Allergies        No past surgical history on file.  MEDICATIONS          Current Outpatient Medications:   •  biotin 5,000 mcg tablet, sublingual, Place under the tongue., Disp: , Rfl:   •  L.acid,gas,plan,rhm/B.ani/cran (UP4 PROBIOTICS WOMEN'S ORAL), Take by mouth., Disp: , Rfl:   •  spironolactone 100 mg tablet, 200 mg. , Disp: , Rfl:   •  tretinoin 0.1 % cream, , Disp: , Rfl:   •  valACYclovir (VALTREX) 500 mg tablet, Take 500 mg by mouth once daily., Disp: , Rfl:   ALLERGIES        Patient has no known allergies.  FAMILY HISTORY        Family History   Problem Relation Age of Onset   • Genetic Disorder Biological Brother    • Hyperlipidemia Maternal Grandfather    • Hypertension Maternal Grandfather    • Breast cancer Neg Hx    • Colon cancer Neg Hx      SOCIAL/ TOBACCO HISTORY        Social History     Tobacco Use   • Smoking status: Never Smoker   • Smokeless tobacco: Never Used   Substance Use Topics   • Alcohol use: Yes   • Drug use: Never     REVIEW OF SYSTEMS        Review of Systems   Constitutional: Negative for chills and fever.   Respiratory: Negative for choking, chest tightness and shortness of breath.    Cardiovascular: Negative for chest pain and palpitations.   Gastrointestinal: Negative for constipation, diarrhea, nausea and vomiting.   Genitourinary: Positive for menstrual problem.   Skin:        Hair loss.       PHYSICAL EXAMINATION   There were no vitals taken for this visit.     Physical Exam  Constitutional:       General: She is not in acute  distress.     Appearance: She is not toxic-appearing.   HENT:      Head: Normocephalic and atraumatic.   Pulmonary:      Comments: Speaking in complete sentences, no breathlessness.   Neurological:      Mental Status: She is alert.     Exam limited secondary to video visit.    ASSESSMENT AND PLAN   Assessment   Problem List Items Addressed This Visit        Digestive    Diarrhea - Primary     Stool studies and labs unremarkable.  Symptoms resolved with stopping prone prebiotic as well as inositol.  Should diarrhea return, discussed with patient that I would recommend her she be evaluated by GI with possible colonoscopy or abdominal imaging.              Genitourinary    PCOS (polycystic ovarian syndrome)     Had to stop inositol due to GI side effects.  She is not interested in metformin.  She is on spironolactone for acne.  She did discuss with both myself and gynecologist her missed menses and need for OCP for endometrial protection.  We again discussed the risks of not being on medication including endometrial hyperplasia and risk for endometrial cancer.  She understands and will plan to discuss further with gynecology in the future.            Other    Hair loss     Patient has noticed a bald spot in the back of her head.  She is currently on 200 mg of spironolactone which she has been taking for approximately 6 months.  She has had elevated testosterone levels in the past however this new patchy hair loss warrants further evaluation.  Fortunately, she has an appointment with her dermatologist this afternoon and will have this evaluated.                 I spent 30 minutes on this date of service performing the following activities: obtaining history, performing examination, entering orders, documenting, preparing for visit and providing counseling and education.    Jenna Madison,   2/8/2022

## 2022-02-08 ENCOUNTER — TELEMEDICINE (OUTPATIENT)
Dept: PRIMARY CARE | Facility: CLINIC | Age: 22
End: 2022-02-08
Payer: COMMERCIAL

## 2022-02-08 ENCOUNTER — APPOINTMENT (RX ONLY)
Dept: URBAN - METROPOLITAN AREA CLINIC 26 | Facility: CLINIC | Age: 22
Setting detail: DERMATOLOGY
End: 2022-02-08

## 2022-02-08 DIAGNOSIS — R19.7 DIARRHEA, UNSPECIFIED TYPE: Primary | ICD-10-CM

## 2022-02-08 DIAGNOSIS — L70.0 ACNE VULGARIS: ICD-10-CM

## 2022-02-08 DIAGNOSIS — E28.2 PCOS (POLYCYSTIC OVARIAN SYNDROME): ICD-10-CM

## 2022-02-08 DIAGNOSIS — L65.9 NONSCARRING HAIR LOSS, UNSPECIFIED: ICD-10-CM

## 2022-02-08 DIAGNOSIS — L65.9 HAIR LOSS: ICD-10-CM

## 2022-02-08 PROBLEM — M79.632 LEFT FOREARM PAIN: Status: RESOLVED | Noted: 2021-04-15 | Resolved: 2022-02-08

## 2022-02-08 PROBLEM — R10.13 DYSPEPSIA: Status: RESOLVED | Noted: 2022-01-07 | Resolved: 2022-02-08

## 2022-02-08 PROCEDURE — ? OTHER

## 2022-02-08 PROCEDURE — ? PHOTO-DOCUMENTATION

## 2022-02-08 PROCEDURE — 99204 OFFICE O/P NEW MOD 45 MIN: CPT

## 2022-02-08 PROCEDURE — ? ADDITIONAL NOTES

## 2022-02-08 PROCEDURE — ? ORDER TESTS

## 2022-02-08 PROCEDURE — 99214 OFFICE O/P EST MOD 30 MIN: CPT | Mod: 95 | Performed by: FAMILY MEDICINE

## 2022-02-08 PROCEDURE — ? PRESCRIPTION MEDICATION MANAGEMENT

## 2022-02-08 PROCEDURE — ? LAB REPORTS REVIEWED

## 2022-02-08 PROCEDURE — ? COUNSELING

## 2022-02-08 ASSESSMENT — ENCOUNTER SYMPTOMS: ROS SKIN COMMENTS: HAIR LOSS.

## 2022-02-08 ASSESSMENT — LOCATION ZONE DERM: LOCATION ZONE: SCALP

## 2022-02-08 ASSESSMENT — LOCATION SIMPLE DESCRIPTION DERM: LOCATION SIMPLE: LEFT SCALP

## 2022-02-08 ASSESSMENT — LOCATION DETAILED DESCRIPTION DERM: LOCATION DETAILED: LEFT MEDIAL FRONTAL SCALP

## 2022-02-08 NOTE — PROCEDURE: PRESCRIPTION MEDICATION MANAGEMENT
Detail Level: Simple
Continue Regimen: Spironolactone 100mg BID as directed by PCP.
Render In Strict Bullet Format?: No

## 2022-02-08 NOTE — ASSESSMENT & PLAN NOTE
Stool studies and labs unremarkable.  Symptoms resolved with stopping prone prebiotic as well as inositol.  Should diarrhea return, discussed with patient that I would recommend her she be evaluated by GI with possible colonoscopy or abdominal imaging.

## 2022-02-08 NOTE — HPI: HAIR LOSS
Previous Labs: Yes
How Did The Hair Loss Occur?: sudden in onset
When Were The Labs Drawn? (Drawn...): 01/2022
Lab Details: Elevated testosterone

## 2022-02-08 NOTE — PROCEDURE: ADDITIONAL NOTES
Additional Notes: Patient consent was obtained to proceed with the visit and recommended plan of care after discussion of all risks and benefits, including the risks of COVID-19 exposure.
Detail Level: Simple
Additional Notes: Pt was diagnosed with PCOS and has been following up with PCP. Recommended pt follows up with an OBGYN and endocrinologist. She is currently only taking spironolactone. Discussed that adding on oral contraceptives May be beneficial for PCOS, hair loss and hormonal acne. Patient states she would like to avoid birth control at this time.
Render Risk Assessment In Note?: no

## 2022-02-08 NOTE — PROCEDURE: OTHER
Render Risk Assessment In Note?: no
Other (Free Text): Continue spironolactone 100mg BID as directed by PCP.
Detail Level: Detailed
Note Text (......Xxx Chief Complaint.): This diagnosis correlates with the

## 2022-02-08 NOTE — ASSESSMENT & PLAN NOTE
Patient has noticed a bald spot in the back of her head.  She is currently on 200 mg of spironolactone which she has been taking for approximately 6 months.  She has had elevated testosterone levels in the past however this new patchy hair loss warrants further evaluation.  Fortunately, she has an appointment with her dermatologist this afternoon and will have this evaluated.

## 2022-02-08 NOTE — PROCEDURE: ORDER TESTS
Bill For Surgical Tray: no
Billing Type: Third-Party Bill
Expected Date Of Service: 02/08/2022
Performing Laboratory: [id number]

## 2022-02-08 NOTE — PROCEDURE: LAB REPORTS REVIEWED
Labs Reviewed Override: CBC, CRP, Testosterone, FSH & LH, and TSH & T4
Detail Level: Zone
Summarized Lab Results: Elevated testosterone (serum 118 ng/dL, free 8.8 pg/mL).\\n\\nCBC, FSH & LH, CRP, and TSH & T4 are all within normal limits.

## 2022-02-08 NOTE — ASSESSMENT & PLAN NOTE
Had to stop inositol due to GI side effects.  She is not interested in metformin.  She is on spironolactone for acne.  She did discuss with both myself and gynecologist her missed menses and need for OCP for endometrial protection.  We again discussed the risks of not being on medication including endometrial hyperplasia and risk for endometrial cancer.  She understands and will plan to discuss further with gynecology in the future.

## 2022-06-06 RX ORDER — VALACYCLOVIR HYDROCHLORIDE 1 G/1
TABLET, FILM COATED ORAL
Qty: 12 TABLET | Refills: 0 | Status: SHIPPED | OUTPATIENT
Start: 2022-06-06 | End: 2022-07-24

## 2022-07-24 RX ORDER — VALACYCLOVIR HYDROCHLORIDE 1 G/1
TABLET, FILM COATED ORAL
Qty: 12 TABLET | Refills: 0 | Status: SHIPPED | OUTPATIENT
Start: 2022-07-24 | End: 2022-10-03

## 2022-10-03 RX ORDER — VALACYCLOVIR HYDROCHLORIDE 1 G/1
TABLET, FILM COATED ORAL
Qty: 12 TABLET | Refills: 0 | Status: SHIPPED | OUTPATIENT
Start: 2022-10-03 | End: 2022-11-01

## 2022-11-01 RX ORDER — VALACYCLOVIR HYDROCHLORIDE 1 G/1
TABLET, FILM COATED ORAL
Qty: 12 TABLET | Refills: 0 | Status: SHIPPED | OUTPATIENT
Start: 2022-11-01 | End: 2022-11-28

## 2022-11-28 RX ORDER — VALACYCLOVIR HYDROCHLORIDE 1 G/1
TABLET, FILM COATED ORAL
Qty: 12 TABLET | Refills: 0 | Status: SHIPPED | OUTPATIENT
Start: 2022-11-28 | End: 2022-12-13

## 2022-12-13 RX ORDER — VALACYCLOVIR HYDROCHLORIDE 1 G/1
TABLET, FILM COATED ORAL
Qty: 12 TABLET | Refills: 0 | Status: SHIPPED | OUTPATIENT
Start: 2022-12-13 | End: 2023-01-26

## 2023-01-03 RX ORDER — SPIRONOLACTONE 100 MG/1
TABLET, FILM COATED ORAL
Qty: 180 TABLET | Refills: 0 | Status: SHIPPED | OUTPATIENT
Start: 2023-01-03 | End: 2023-03-30

## 2023-01-26 RX ORDER — VALACYCLOVIR HYDROCHLORIDE 1 G/1
TABLET, FILM COATED ORAL
Qty: 12 TABLET | Refills: 0 | Status: SHIPPED | OUTPATIENT
Start: 2023-01-26 | End: 2023-03-06

## 2023-03-06 RX ORDER — VALACYCLOVIR HYDROCHLORIDE 1 G/1
TABLET, FILM COATED ORAL
Qty: 12 TABLET | Refills: 0 | Status: SHIPPED | OUTPATIENT
Start: 2023-03-06 | End: 2023-03-31

## 2023-03-30 RX ORDER — SPIRONOLACTONE 100 MG/1
TABLET, FILM COATED ORAL
Qty: 60 TABLET | Refills: 0 | Status: SHIPPED | OUTPATIENT
Start: 2023-03-30 | End: 2023-04-03 | Stop reason: SDUPTHER

## 2023-03-31 RX ORDER — VALACYCLOVIR HYDROCHLORIDE 1 G/1
TABLET, FILM COATED ORAL
Qty: 12 TABLET | Refills: 0 | Status: SHIPPED | OUTPATIENT
Start: 2023-03-31 | End: 2023-04-03 | Stop reason: SDUPTHER